# Patient Record
Sex: FEMALE | Race: BLACK OR AFRICAN AMERICAN | NOT HISPANIC OR LATINO
[De-identification: names, ages, dates, MRNs, and addresses within clinical notes are randomized per-mention and may not be internally consistent; named-entity substitution may affect disease eponyms.]

---

## 2019-09-13 ENCOUNTER — RESULT REVIEW (OUTPATIENT)
Age: 59
End: 2019-09-13

## 2023-03-07 ENCOUNTER — APPOINTMENT (OUTPATIENT)
Dept: ORTHOPEDIC SURGERY | Facility: CLINIC | Age: 63
End: 2023-03-07
Payer: COMMERCIAL

## 2023-03-07 VITALS
HEART RATE: 80 BPM | WEIGHT: 202 LBS | DIASTOLIC BLOOD PRESSURE: 89 MMHG | HEIGHT: 64 IN | BODY MASS INDEX: 34.49 KG/M2 | SYSTOLIC BLOOD PRESSURE: 142 MMHG

## 2023-03-07 DIAGNOSIS — Z78.9 OTHER SPECIFIED HEALTH STATUS: ICD-10-CM

## 2023-03-07 DIAGNOSIS — G56.80 OTHER SPECIFIED MONONEUROPATHIES OF UNSPECIFIED UPPER LIMB: ICD-10-CM

## 2023-03-07 DIAGNOSIS — Z83.3 FAMILY HISTORY OF DIABETES MELLITUS: ICD-10-CM

## 2023-03-07 DIAGNOSIS — Z83.438 FAMILY HISTORY OF OTHER DISORDER OF LIPOPROTEIN METABOLISM AND OTHER LIPIDEMIA: ICD-10-CM

## 2023-03-07 DIAGNOSIS — Z82.49 FAMILY HISTORY OF ISCHEMIC HEART DISEASE AND OTHER DISEASES OF THE CIRCULATORY SYSTEM: ICD-10-CM

## 2023-03-07 DIAGNOSIS — M17.11 UNILATERAL PRIMARY OSTEOARTHRITIS, RIGHT KNEE: ICD-10-CM

## 2023-03-07 DIAGNOSIS — M17.12 UNILATERAL PRIMARY OSTEOARTHRITIS, LEFT KNEE: ICD-10-CM

## 2023-03-07 DIAGNOSIS — Z81.8 FAMILY HISTORY OF OTHER MENTAL AND BEHAVIORAL DISORDERS: ICD-10-CM

## 2023-03-07 DIAGNOSIS — Z82.61 FAMILY HISTORY OF ARTHRITIS: ICD-10-CM

## 2023-03-07 PROCEDURE — 72100 X-RAY EXAM L-S SPINE 2/3 VWS: CPT

## 2023-03-07 PROCEDURE — 99214 OFFICE O/P EST MOD 30 MIN: CPT

## 2023-03-07 PROCEDURE — 99204 OFFICE O/P NEW MOD 45 MIN: CPT

## 2023-03-07 NOTE — PHYSICAL EXAM
[de-identified] : CONSTITUTIONAL: The patient is a very pleasant individual who is well-nourished and who appears stated age.\par PSYCHIATRIC: The patient is alert and oriented X 3 and in no apparent distress, and participates with orthopedic evaluation well.\par HEAD: Atraumatic and is nonsyndromic in appearance.\par EENT: No visible thyromegaly, EOMI.\par RESPIRATORY: Respiratory rate is regular, not dyspneic on examination.\par LYMPHATICS: There is no inguinal lymphadenopathy\par INTEGUMENTARY: Skin is clean, dry, and intact about the bilateral lower extremities and lumbar spine.\par VASCULAR: There is brisk capillary refill about the bilateral lower extremities.\par NEUROLOGIC: There are no pathologic reflexes. There is no decrease in sensation of the bilateral lower extremities on Wartenberg pinwheel examination. Deep tendon reflexes are well maintained at 2+/4 of the bilateral lower extremities and are symmetric..\par MUSCULOSKELETAL: There is no visible muscular atrophy. Manual motor strength is well maintained in the bilateral lower extremities. Range of motion of lumbar spine is well maintained. The patient ambulates in a non-myelopathic manner. Negative tension sign and straight leg raise bilaterally. Quad extension, ankle dorsiflexion, EHL, plantar flexion, and ankle eversion are well preserved. Normal secondary orthopaedic exam of bilateral hips, knees and ankles\par Exam is highlighted by mechanical low back pain on flexion, bilateral greater trochanteric tenderness bilateral gluteus tendinopathy/tenderness to palpation.  Discomfort on adduction of the scapula bilaterally and there is tenderness palpation of the scapulothoracic borders bilaterally/myositis. [de-identified] : AP lateral x-ray lumbar spine done on March 7, 2023 here in the office demonstrates grade 1 spondylolisthesis at L5-S1 otherwise unremarkable.  Intervertebral disc spaces are well-maintained.

## 2023-03-07 NOTE — HISTORY OF PRESENT ILLNESS
[de-identified] : 62-year-old female is here for low back pain complaint.  Back pain is described as a dull ache.  It rates 7 out of 10 at worst, 4 out of 10 at best.  She states she had a bout of similar pain back in 2015 which decreased under the care of her primary care provider with gabapentin Tylenol and physical therapy.  Pain was resolved until approximately 2 years ago.  Pain is located across her low back as well as the bilateral buttocks, lateral hips.  He also has pain radiating upward between her scapula.  He says she can walk unlimited but slowly because her back hurts.  She states she cannot lay flat on her back while sleeping because of the pain.  She has not had any recent physical therapy chiropractic or pain management but is doing daily stretching as well as topicals and Tylenol but feels her pain continues to be uncontrolled.  She also does have bilateral focal knee pain on going up and down stairs in particular.  No change in bowel or bladder.  SUSHANT questionnaire is negative.  Past medical surgical social family and allergy history was reviewed.  Of note, she states that she has intermittent swelling of her ankles when standing for long periods of time.

## 2023-03-07 NOTE — DISCUSSION/SUMMARY
[de-identified] : 40 minutes was spent reviewing the x-rays as well as discussing with the patient their clinical presentation, diagnosis and providing education.  Conservative treatment was discussed with the patient at length. Anticipatory guidance regarding disease process Yohe thoracic dysfunction, lumbar spondylosis, spondylolisthesis L4-L5, bilateral greater trochanteric bursitis, bilateral gluteus tendinopathy, avoidance of acute exacerbation this was discussed at length and all patients commenting concerns were answered to the patient's satisfaction. Physical therapy for decrease pain and increase function was ordered. Patient was given home exercises as approved by [employer] and works well held directed toward this particular process. Intermittent use of acetaminophen 500 mg 2 tablets t.i.d. p.r.n. mild to moderate pain, ibuprofen 600 mg t.i.d. p.r.n. severe pain with food or milk for breakthrough pain.  She does state that she oftentimes gets rashes and she would not like any prescription medication at this time.  Pain is not adequately controlled with her current treatment plan, I think she should consider a Medrol Dosepak or injections with pain management.. Home exercise including stretching on a daily basis for 20-30 minutes was recommended. Heat, ice, topical were discussed as needed. The patient will followup in 6 weeks at which point in time if symptoms continue we will order MRI lumbar studies to guide treatment plan including possible injection therapy with pain management versus surgical option.  He is following with knee service here at [facility] regarding her focal knee pain.  I have ordered physical therapy to start for this as well.  Regarding intermittent bilateral venous stasis type symptoms, light support stockings on a daily basis, elevating her feet when she is sitting and follow-up with primary care provider for this issue.

## 2023-03-17 ENCOUNTER — APPOINTMENT (OUTPATIENT)
Dept: OBGYN | Facility: CLINIC | Age: 63
End: 2023-03-17
Payer: COMMERCIAL

## 2023-03-17 VITALS
WEIGHT: 210.38 LBS | HEART RATE: 79 BPM | DIASTOLIC BLOOD PRESSURE: 98 MMHG | HEIGHT: 64 IN | SYSTOLIC BLOOD PRESSURE: 169 MMHG | BODY MASS INDEX: 35.92 KG/M2

## 2023-03-17 VITALS — DIASTOLIC BLOOD PRESSURE: 84 MMHG | SYSTOLIC BLOOD PRESSURE: 169 MMHG

## 2023-03-17 DIAGNOSIS — Z12.39 ENCOUNTER FOR OTHER SCREENING FOR MALIGNANT NEOPLASM OF BREAST: ICD-10-CM

## 2023-03-17 DIAGNOSIS — Z01.419 ENCOUNTER FOR GYNECOLOGICAL EXAMINATION (GENERAL) (ROUTINE) W/OUT ABNORMAL FINDINGS: ICD-10-CM

## 2023-03-17 DIAGNOSIS — Z78.9 OTHER SPECIFIED HEALTH STATUS: ICD-10-CM

## 2023-03-17 DIAGNOSIS — R92.2 INCONCLUSIVE MAMMOGRAM: ICD-10-CM

## 2023-03-17 PROCEDURE — 99386 PREV VISIT NEW AGE 40-64: CPT

## 2023-03-17 RX ORDER — ACETAMINOPHEN 325 MG/1
TABLET, FILM COATED ORAL
Refills: 0 | Status: ACTIVE | COMMUNITY

## 2023-03-17 NOTE — HISTORY OF PRESENT ILLNESS
[Mammogramdate] : emr [BreastSonogramDate] : emr [PapSmeardate] : emr [BoneDensityDate] : emr [ColonoscopyDate] : emr [Previously active] : previously active [Men] : men

## 2023-03-20 ENCOUNTER — APPOINTMENT (OUTPATIENT)
Dept: ORTHOPEDIC SURGERY | Facility: CLINIC | Age: 63
End: 2023-03-20
Payer: COMMERCIAL

## 2023-03-20 VITALS
BODY MASS INDEX: 35.85 KG/M2 | SYSTOLIC BLOOD PRESSURE: 158 MMHG | DIASTOLIC BLOOD PRESSURE: 95 MMHG | WEIGHT: 210 LBS | HEART RATE: 75 BPM | HEIGHT: 64 IN

## 2023-03-20 DIAGNOSIS — M17.0 BILATERAL PRIMARY OSTEOARTHRITIS OF KNEE: ICD-10-CM

## 2023-03-20 LAB — HPV HIGH+LOW RISK DNA PNL CVX: NOT DETECTED

## 2023-03-20 PROCEDURE — 99214 OFFICE O/P EST MOD 30 MIN: CPT

## 2023-03-20 PROCEDURE — 73562 X-RAY EXAM OF KNEE 3: CPT | Mod: 50

## 2023-03-20 RX ORDER — MELOXICAM 15 MG/1
15 TABLET ORAL DAILY
Qty: 30 | Refills: 1 | Status: ACTIVE | COMMUNITY
Start: 2023-03-20 | End: 1900-01-01

## 2023-03-20 NOTE — END OF VISIT
[FreeTextEntry3] : I, Tonia Sampson, acted solely as a scribe for Dr. Hang Bermeo on this date 03/20/2023 .

## 2023-03-20 NOTE — PHYSICAL EXAM
[de-identified] : GENERAL APPEARANCE: Well nourished and hydrated, pleasant, alert, and oriented x 3. Appears their stated age. \par HEENT: Normocephalic, extraocular eye motion intact. Nasal septum midline. Oral cavity clear. External auditory canal clear. \par RESPIRATORY: Breath sounds clear and audible in all lobes. No wheezing, No accessory muscle use.\par CARDIOVASCULAR: No apparent abnormalities. No lower leg edema. No varicosities. Pedal pulses are palpable.\par NEUROLOGIC: Sensation is normal, no muscle weakness in the upper or lower extremities.\par DERMATOLOGIC: No apparent skin lesions, moist, warm, no rash.\par SPINE: Cervical spine appears normal and moves freely; thoracic spine appears normal and moves freely; lumbosacral spine appears normal and moves freely, normal, nontender.\par MUSCULOSKELETAL: Hands, wrists, and elbows are normal and move freely, shoulders are normal and move freely. \par Constitutional: Alert and in no acute distress, but well-appearing, not in acute distress and not obese. \par Psychiatric: Oriented to person, place, and time, insight and judgement were intact and the affect was normal.  [de-identified] : b/l knee exam shows medial and lateral joint line tenderness, L>>R knee.  range of motion 0-130 degrees. \par b/l hip exams shows left hip pain with internal rotation. \par 5/5 motor strength in bilateral lower extremities. \par Sensory: Intact in bilateral lower extremities. DTRs: Biceps, brachioradialis, triceps, patellar, ankle and plantar 2+ and symmetric bilaterally. Pulses: dorsalis pedis, posterior tibial, femoral, popliteal, and radial 2+ and symmetric bilaterally.  [de-identified] : 5V xray of the b/l knee done in the office today and reviewed by Dr. Hang Bermeo demonstrates mild medial compartment o.a. of the left knee and mild patellofemoral o.a. of the right knee.

## 2023-03-20 NOTE — HISTORY OF PRESENT ILLNESS
[Pain Location] : pain [Worsening] : worsening [Sitting] : worsened by sitting [Walking] : worsened by walking [Acetaminophen] : relieved by acetaminophen [NSAIDs] : relieved by nonsteroidal anti-inflammatory drugs [Rest] : relieved by rest [de-identified] : pt is here with b/l  knee pain, left greater than right. pain affecting lateral and medial knee. \par pain worse with climbing down the stairs\par she noted pain for 3-4 years \par she feels the pain is unbearable. she applies heat and rub oil, which helps slightly \par reports left groin pain and anterior thigh pain.\par no trouble with shoes or socks \par she has seen PCP and refer to see orthopedic\par pt was recommended take tylenol arthritis. No GI ulcers or GERD, but chronic hematuria \par pt tried gabapentin in 2015 \par pt is retired, maintains activity with walking daily \par

## 2023-03-20 NOTE — DISCUSSION/SUMMARY
[de-identified] : 61 y/o F with left greater than right knee pain due to mild medial compartment o.a. of the left knee and mild patellofemoral o.a. of the right knee. Based on her imaging, she is not a candidate for a TKA. We discussed exhausting conservative therapy options such as HA injections, cortisone injections, and low impact exercise. I recommend the patient undergo a course of physical therapy for b/l knee o.a. 2-3 times a week for a total of 6-8 weeks. A prescription was given for the physical therapy today. I recommend that the patient utilize meloxicam with food once per day as instructed. A prescription for the meloxicam was ordered for the patient in the office today. F/u with us in 8 weeks. \par

## 2023-03-22 LAB — CYTOLOGY CVX/VAG DOC THIN PREP: ABNORMAL

## 2023-04-18 ENCOUNTER — APPOINTMENT (OUTPATIENT)
Dept: ORTHOPEDIC SURGERY | Facility: CLINIC | Age: 63
End: 2023-04-18

## 2023-06-13 ENCOUNTER — APPOINTMENT (OUTPATIENT)
Dept: ORTHOPEDIC SURGERY | Facility: CLINIC | Age: 63
End: 2023-06-13
Payer: COMMERCIAL

## 2023-06-13 VITALS
HEART RATE: 77 BPM | TEMPERATURE: 98.1 F | HEIGHT: 64 IN | DIASTOLIC BLOOD PRESSURE: 93 MMHG | SYSTOLIC BLOOD PRESSURE: 147 MMHG | WEIGHT: 210 LBS | BODY MASS INDEX: 35.85 KG/M2

## 2023-06-13 DIAGNOSIS — M43.16 SPONDYLOLISTHESIS, LUMBAR REGION: ICD-10-CM

## 2023-06-13 DIAGNOSIS — M70.61 TROCHANTERIC BURSITIS, RIGHT HIP: ICD-10-CM

## 2023-06-13 DIAGNOSIS — M48.062 SPINAL STENOSIS, LUMBAR REGION WITH NEUROGENIC CLAUDICATION: ICD-10-CM

## 2023-06-13 DIAGNOSIS — M70.62 TROCHANTERIC BURSITIS, RIGHT HIP: ICD-10-CM

## 2023-06-13 PROCEDURE — 99214 OFFICE O/P EST MOD 30 MIN: CPT

## 2023-06-13 RX ORDER — GABAPENTIN 300 MG/1
300 CAPSULE ORAL
Qty: 30 | Refills: 0 | Status: ACTIVE | COMMUNITY
Start: 2023-06-13 | End: 1900-01-01

## 2023-06-13 NOTE — DISCUSSION/SUMMARY
[Medication Risks Reviewed] : Medication risks reviewed [4 Weeks] : in 4 weeks [de-identified] : lumbar MRI is ordered is medically necessary for failure of conservative treatment per HPI.  Patient is having symptoms suspicious for neurogenic claudication, MRI will guide treatment plan injection therapy versus surgical management.  Tylenol 500 mg 2 tablets every 6-8 hrs. as needed mild to moderate pain, use of meloxicam 15 mg once daily as needed for severe pain and inflammation.  Because she is having pain during sleep time and gabapentin has worked in the past, gabapentin 300 mg p.o. at bedtime was prescribed.  Risk benefits alternatives were discussed at length regarding medical therapy.  Continue physical therapy home exercises foam rolling modalities etc.  We will follow-up after MRI is completed.

## 2023-06-13 NOTE — HISTORY OF PRESENT ILLNESS
[de-identified] : 62-year-old female is here for follow-up of low back pain.  She has been involved with physical therapy since her last visit here, has been consistently going 2-3 times a week particularly for back and leg pain, feels that her legs are heavy when she walks a great deal of time despite physical therapy home exercises and meloxicam intermittently.  She states her legs feel like sandbags, feel heavy when she walks.  No change in bowel or bladder.  Does continue to have lateral hip pain as well as groin pain intermittently.

## 2023-06-13 NOTE — PHYSICAL EXAM
[de-identified] : CONSTITUTIONAL: The patient is a very pleasant individual who is well-nourished and who appears stated age.\par PSYCHIATRIC: The patient is alert and oriented X 3 and in no apparent distress, and participates with orthopedic evaluation well.\par HEAD: Atraumatic and is nonsyndromic in appearance.\par EENT: No visible thyromegaly, EOMI.\par RESPIRATORY: Respiratory rate is regular, not dyspneic on examination.\par LYMPHATICS: There is no inguinal lymphadenopathy\par INTEGUMENTARY: Skin is clean, dry, and intact about the bilateral lower extremities and lumbar spine.\par VASCULAR: There is brisk capillary refill about the bilateral lower extremities.\par NEUROLOGIC: There are no pathologic reflexes. There is no decrease in sensation of the bilateral lower extremities on Wartenberg pinwheel examination. Deep tendon reflexes are well maintained at 2+/4 of the bilateral lower extremities and are symmetric..\par MUSCULOSKELETAL: There is no visible muscular atrophy. Manual motor strength is well maintained in the bilateral lower extremities. Range of motion of lumbar spine is well maintained. The patient ambulates in a non-myelopathic manner. Negative tension sign and straight leg raise bilaterally. Quad extension, ankle dorsiflexion, EHL, plantar flexion, and ankle eversion are well preserved. Normal secondary orthopaedic exam of bilateral hips, knees and ankles\par Exam is highlighted by mechanical low back pain on flexion, bilateral greater trochanteric tenderness bilateral gluteus tendinopathy/tenderness to palpation. Discomfort on adduction of the scapula bilaterally and there is tenderness palpation of the scapulothoracic borders bilaterally/myositis.

## 2023-06-22 ENCOUNTER — APPOINTMENT (OUTPATIENT)
Dept: MRI IMAGING | Facility: CLINIC | Age: 63
End: 2023-06-22

## 2023-06-22 ENCOUNTER — OUTPATIENT (OUTPATIENT)
Dept: OUTPATIENT SERVICES | Facility: HOSPITAL | Age: 63
LOS: 1 days | End: 2023-06-22
Payer: COMMERCIAL

## 2023-06-22 DIAGNOSIS — M47.816 SPONDYLOSIS WITHOUT MYELOPATHY OR RADICULOPATHY, LUMBAR REGION: ICD-10-CM

## 2023-06-22 PROCEDURE — 72148 MRI LUMBAR SPINE W/O DYE: CPT | Mod: 26

## 2023-12-15 ENCOUNTER — APPOINTMENT (OUTPATIENT)
Dept: ORTHOPEDIC SURGERY | Facility: CLINIC | Age: 63
End: 2023-12-15
Payer: COMMERCIAL

## 2023-12-15 VITALS
HEART RATE: 72 BPM | DIASTOLIC BLOOD PRESSURE: 91 MMHG | BODY MASS INDEX: 33.99 KG/M2 | WEIGHT: 204 LBS | HEIGHT: 65 IN | SYSTOLIC BLOOD PRESSURE: 151 MMHG

## 2023-12-15 DIAGNOSIS — M67.952 UNSPECIFIED DISORDER OF SYNOVIUM AND TENDON, LEFT THIGH: ICD-10-CM

## 2023-12-15 PROCEDURE — 99213 OFFICE O/P EST LOW 20 MIN: CPT

## 2023-12-15 RX ORDER — METHYLPREDNISOLONE 4 MG/1
4 TABLET ORAL
Qty: 1 | Refills: 0 | Status: ACTIVE | COMMUNITY
Start: 2023-12-15 | End: 1900-01-01

## 2023-12-15 RX ORDER — NAPROXEN 500 MG/1
500 TABLET ORAL
Qty: 30 | Refills: 0 | Status: ACTIVE | COMMUNITY
Start: 2023-12-15 | End: 1900-01-01

## 2023-12-15 NOTE — PHYSICAL EXAM
[Antalgic] : antalgic [de-identified] : CONSTITUTIONAL: The patient is a very pleasant individual who is well-nourished and who appears stated age. PSYCHIATRIC: The patient is alert and oriented X 3 and in no apparent distress, and participates with orthopedic evaluation well. HEAD: Atraumatic and is nonsyndromic in appearance. EENT: No visible thyromegaly, EOMI. RESPIRATORY: Respiratory rate is regular, not dyspneic on examination. LYMPHATICS: There is no inguinal lymphadenopathy INTEGUMENTARY: Skin is clean, dry, and intact about the bilateral lower extremities and lumbar spine. VASCULAR: There is brisk capillary refill about the bilateral lower extremities. NEUROLOGIC: There are no pathologic reflexes. There is no decrease in sensation of the bilateral lower extremities on manual  examination. Deep tendon reflexes are well maintained at 2+/4 of the bilateral lower extremities and are symmetric.. MUSCULOSKELETAL: There is no visible muscular atrophy. Manual motor strength is well maintained in the bilateral lower extremities. Range of motion of lumbar spine is well maintained. The patient ambulates in a non-myelopathic manner. Negative tension sign and straight leg raise bilaterally. Quad extension, ankle dorsiflexion, EHL, plantar flexion, and ankle eversion are well preserved. Normal secondary orthopaedic exam of bilateral hips, greater trochanteric area, knees and ankles, physical exam is consistent primarily with gluteal tendinopathy left greater than the right. [de-identified] : MRI of the lumbar spine has been reviewed that shows essentially very well-preserved lumbar disc processes there is some epidural fat at L5-S1.  Otherwise essentially benign lumbar MRI previous x-rays been reviewed of the lumbar spine that demonstrates mild age-appropriate lumbar spondylosis.  Very subtle spondylolisthesis is noted at L4-5 both on the MRI and x-ray

## 2023-12-15 NOTE — HISTORY OF PRESENT ILLNESS
[de-identified] : Patient follows up with an exacerbation of lower back pain.  As well as left superior gluteal trigger point type finding/gluteal tendinopathy.  She states past physical therapy visits and evaluations are very very helpful in controlling her signs and symptoms.  Patient presents rather acutely at this point in time. [Worsening] : worsening [3] : a current pain level of 3/10 [10] : a maximum pain level of 10/10 [Constant] : ~He/She~ states the symptoms seem to be constant [Bending] : worsened by bending [Lifting] : worsened by lifting [Rest] : relieved by rest [Ataxia] : no ataxia [Incontinence] : no incontinence [Loss of Dexterity] : good dexterity [Urinary Ret.] : no urinary retention

## 2023-12-15 NOTE — DISCUSSION/SUMMARY
[de-identified] : Patient was doing very well over the summer with physical therapy she had an exacerbation of back pain recently her daughters have moved she had a few funerals to go to appears to be more of a left superior gluteal myositis patient is can be enrolled in physical therapy started on a Medrol Dosepak as well as naproxen sodium patient will follow-up in 4 to 6 weeks if signs and symptoms are still persistent consider repeat lumbar MRI and a referral to physical medicine and rehab for injection therapy

## 2024-01-26 ENCOUNTER — APPOINTMENT (OUTPATIENT)
Dept: ORTHOPEDIC SURGERY | Facility: CLINIC | Age: 64
End: 2024-01-26

## 2024-02-06 ENCOUNTER — APPOINTMENT (OUTPATIENT)
Dept: ORTHOPEDIC SURGERY | Facility: CLINIC | Age: 64
End: 2024-02-06
Payer: COMMERCIAL

## 2024-02-06 VITALS
BODY MASS INDEX: 33.99 KG/M2 | HEIGHT: 65 IN | SYSTOLIC BLOOD PRESSURE: 161 MMHG | WEIGHT: 204 LBS | DIASTOLIC BLOOD PRESSURE: 103 MMHG | HEART RATE: 71 BPM

## 2024-02-06 DIAGNOSIS — M47.816 SPONDYLOSIS W/OUT MYELOPATHY OR RADICULOPATHY, LUMBAR REGION: ICD-10-CM

## 2024-02-06 DIAGNOSIS — M60.9 MYOSITIS, UNSPECIFIED: ICD-10-CM

## 2024-02-06 PROCEDURE — 72100 X-RAY EXAM L-S SPINE 2/3 VWS: CPT

## 2024-02-06 PROCEDURE — 99213 OFFICE O/P EST LOW 20 MIN: CPT

## 2024-02-06 NOTE — HISTORY OF PRESENT ILLNESS
[de-identified] : Amanda is a very pleasant 63-year-old female that is responding very well to physical therapy and she is slowly making some long-term progress and diminishing her pain and discomfort.  As stated physical therapy is helping she home exercises also be slowly introduced.  SUSHANT questionnaire is negative no radicular complaints and no motor loss [Improving] : improving [4] : a current pain level of 4/10 [10] : a maximum pain level of 10/10 [Bending] : worsened by bending [Lifting] : worsened by lifting [Exercise Regimen] : relieved by exercise regimen [NSAIDs] : relieved by nonsteroidal anti-inflammatory drugs [Physical Therapy] : relieved by physical therapy [Ataxia] : no ataxia [Incontinence] : no incontinence [Loss of Dexterity] : good dexterity [Urinary Ret.] : no urinary retention

## 2024-02-06 NOTE — DISCUSSION/SUMMARY
[de-identified] : Nereida is doing very well with regard to cervical thoracic and lumbar myositis progressing well with physical therapy home exercises topical modalities this has been reinforced she is going to add an exercise bike we have provided home exercise sheets for her to follow as well.  Patient is going to follow-up in approximately 4 to 6 months after continued conservative care core strengthening aerobic conditioning.  We have discussed the L4-5 spondylolisthesis potential need for surgery in the future I would not recommend that at this point in time because neurogenic claudication is not paramount and I would like to avoid adjacent segment disease.

## 2024-02-06 NOTE — PHYSICAL EXAM
[Normal] : Gait: normal [de-identified] : CONSTITUTIONAL: The patient is a very pleasant individual who is well-nourished and who appears stated age. PSYCHIATRIC: The patient is alert and oriented X 3 and in no apparent distress, and participates with orthopedic evaluation well. HEAD: Atraumatic and is nonsyndromic in appearance. EENT: No visible thyromegaly, EOMI. RESPIRATORY: Respiratory rate is regular, not dyspneic on examination. LYMPHATICS: There is no inguinal lymphadenopathy INTEGUMENTARY: Skin is clean, dry, and intact about the bilateral lower extremities and lumbar spine. VASCULAR: There is brisk capillary refill about the bilateral lower extremities. NEUROLOGIC: There are no pathologic reflexes. There is no decrease in sensation of the bilateral lower extremities on manual  examination. Deep tendon reflexes are well maintained at 2+/4 of the bilateral lower extremities and are symmetric.. MUSCULOSKELETAL: There is no visible muscular atrophy. Manual motor strength is well maintained in the bilateral lower extremities. Range of motion of lumbar spine is well maintained. The patient ambulates in a non-myelopathic manner. Negative tension sign and straight leg raise bilaterally. Quad extension, ankle dorsiflexion, EHL, plantar flexion, and ankle eversion are well preserved. Normal secondary orthopaedic exam of bilateral hips, greater trochanteric area, knees and ankles, much improved cervical thoracic and lumbar myositis [de-identified] : X-rays taken on today's date shows a subtle grade 1 spondylolisthesis L4-L5 compared to x-rays from March 2023 unchanged no acute osseous abnormalities no significant scoliosis and pedicles are well-maintained.

## 2024-02-08 ENCOUNTER — APPOINTMENT (OUTPATIENT)
Dept: ORTHOPEDIC SURGERY | Facility: CLINIC | Age: 64
End: 2024-02-08
Payer: COMMERCIAL

## 2024-02-08 VITALS
BODY MASS INDEX: 33.99 KG/M2 | HEIGHT: 65 IN | WEIGHT: 204 LBS | HEART RATE: 71 BPM | SYSTOLIC BLOOD PRESSURE: 156 MMHG | DIASTOLIC BLOOD PRESSURE: 111 MMHG

## 2024-02-08 DIAGNOSIS — M25.559 PAIN IN UNSPECIFIED HIP: ICD-10-CM

## 2024-02-08 PROCEDURE — 99213 OFFICE O/P EST LOW 20 MIN: CPT

## 2024-02-08 PROCEDURE — 73522 X-RAY EXAM HIPS BI 3-4 VIEWS: CPT | Mod: 26

## 2024-02-08 NOTE — DISCUSSION/SUMMARY
[de-identified] : The patient is a 63-year-old female here for follow-up of bilateral hip pain.  I discussed with the patient that her symptoms are likely due to her lumbar spine issues.  She is already in physical therapy and reports that this does help her pain.  She is symptomatic in her left groin on exam with internal rotation.  I have ordered an MRI of the left hip to evaluate for arthritis or trochanteric bursitis.  We discussed that we can inject the left hip joint of the left hip bursa depending on the MRI results.  Patient will continue physical therapy I will call patient to review MRI results and to discuss next steps

## 2024-02-08 NOTE — HISTORY OF PRESENT ILLNESS
[de-identified] : The patient is a 63-year-old female here for follow-up of bilateral hip pain.  The patient does see Dr. Coley for lumbar stenosis with neurogenic claudication.  Patient reports her pain is in her bilateral lower back rating down the sides of her hips towards the feet.  On the left, she does report groin pain that is worse with movement.  She also has lateral hip pain when laying on her left side at night.  She has had trochanteric bursitis in the past

## 2024-02-08 NOTE — PHYSICAL EXAM
[] : Sensory: [Normal] : No costovertebral angle tenderness and no spinal tenderness [de-identified] : Right hip exam normal range of motion without pain.  Left hip exam Limited IR due to pain.  Tenderness over the IT band and trochanteric bursa on the left. [de-identified] : 5 view x-ray of the bilateral hip show mild osteoarthritis on the left

## 2024-02-08 NOTE — REVIEW OF SYSTEMS
[Joint Pain] : joint pain [Joint Stiffness] : joint stiffness [Joint Swelling] : joint swelling [Negative] : Heme/Lymph [FreeTextEntry9] : Bilateral hip pain

## 2024-02-17 ENCOUNTER — OUTPATIENT (OUTPATIENT)
Dept: OUTPATIENT SERVICES | Facility: HOSPITAL | Age: 64
LOS: 1 days | End: 2024-02-17

## 2024-02-17 ENCOUNTER — APPOINTMENT (OUTPATIENT)
Dept: MRI IMAGING | Facility: CLINIC | Age: 64
End: 2024-02-17
Payer: COMMERCIAL

## 2024-02-17 DIAGNOSIS — Z00.8 ENCOUNTER FOR OTHER GENERAL EXAMINATION: ICD-10-CM

## 2024-02-17 PROCEDURE — 73721 MRI JNT OF LWR EXTRE W/O DYE: CPT | Mod: 26,LT
